# Patient Record
Sex: FEMALE | Race: WHITE | NOT HISPANIC OR LATINO | URBAN - METROPOLITAN AREA
[De-identification: names, ages, dates, MRNs, and addresses within clinical notes are randomized per-mention and may not be internally consistent; named-entity substitution may affect disease eponyms.]

---

## 2017-03-27 ENCOUNTER — APPOINTMENT (OUTPATIENT)
Age: 67
Setting detail: DERMATOLOGY
End: 2017-03-28

## 2017-03-27 DIAGNOSIS — Z85.828 PERSONAL HISTORY OF OTHER MALIGNANT NEOPLASM OF SKIN: ICD-10-CM

## 2017-03-27 DIAGNOSIS — Z71.89 OTHER SPECIFIED COUNSELING: ICD-10-CM

## 2017-03-27 DIAGNOSIS — L82.1 OTHER SEBORRHEIC KERATOSIS: ICD-10-CM

## 2017-03-27 DIAGNOSIS — D22 MELANOCYTIC NEVI: ICD-10-CM

## 2017-03-27 DIAGNOSIS — D18.0 HEMANGIOMA: ICD-10-CM

## 2017-03-27 DIAGNOSIS — L81.4 OTHER MELANIN HYPERPIGMENTATION: ICD-10-CM

## 2017-03-27 DIAGNOSIS — D485 NEOPLASM OF UNCERTAIN BEHAVIOR OF SKIN: ICD-10-CM

## 2017-03-27 PROBLEM — D18.01 HEMANGIOMA OF SKIN AND SUBCUTANEOUS TISSUE: Status: ACTIVE | Noted: 2017-03-27

## 2017-03-27 PROBLEM — D48.5 NEOPLASM OF UNCERTAIN BEHAVIOR OF SKIN: Status: ACTIVE | Noted: 2017-03-27

## 2017-03-27 PROBLEM — D22.5 MELANOCYTIC NEVI OF TRUNK: Status: ACTIVE | Noted: 2017-03-27

## 2017-03-27 PROBLEM — D23.72 OTHER BENIGN NEOPLASM OF SKIN OF LEFT LOWER LIMB, INCLUDING HIP: Status: ACTIVE | Noted: 2017-03-27

## 2017-03-27 PROCEDURE — OTHER COUNSELING: OTHER

## 2017-03-27 PROCEDURE — 99213 OFFICE O/P EST LOW 20 MIN: CPT | Mod: 25

## 2017-03-27 PROCEDURE — OTHER BIOPSY BY PUNCH METHOD: OTHER

## 2017-03-27 PROCEDURE — 11100: CPT

## 2017-03-27 ASSESSMENT — LOCATION SIMPLE DESCRIPTION DERM
LOCATION SIMPLE: RIGHT FOREARM
LOCATION SIMPLE: LEFT UPPER BACK
LOCATION SIMPLE: LEFT FOREARM
LOCATION SIMPLE: CHEST
LOCATION SIMPLE: RIGHT EYEBROW
LOCATION SIMPLE: UPPER BACK
LOCATION SIMPLE: LEFT ANTERIOR NECK

## 2017-03-27 ASSESSMENT — LOCATION DETAILED DESCRIPTION DERM
LOCATION DETAILED: RIGHT LATERAL EYEBROW
LOCATION DETAILED: RIGHT PROXIMAL DORSAL FOREARM
LOCATION DETAILED: LEFT PROXIMAL DORSAL FOREARM
LOCATION DETAILED: SUPERIOR THORACIC SPINE
LOCATION DETAILED: LEFT CLAVICULAR NECK
LOCATION DETAILED: LEFT MEDIAL UPPER BACK
LOCATION DETAILED: LEFT LATERAL SUPERIOR CHEST
LOCATION DETAILED: RIGHT MEDIAL SUPERIOR CHEST

## 2017-03-27 ASSESSMENT — LOCATION ZONE DERM
LOCATION ZONE: TRUNK
LOCATION ZONE: FACE
LOCATION ZONE: NECK
LOCATION ZONE: ARM

## 2017-03-27 NOTE — PROCEDURE: BIOPSY BY PUNCH METHOD
Consent: Written consent was obtained and risks were reviewed including but not limited to scarring, infection, bleeding, scabbing, incomplete removal, nerve damage and allergy to anesthesia.
Size Of Lesion In Cm (Optional): 0
Epidermal Sutures: 4-0 Silk
Dressing: bandage
Render Post-Care Instructions In Note?: no
Biopsy Type: H and E
Wound Care: Aquaphor
Anesthesia Volume In Cc (Will Not Render If 0): 3
Body Location Override (Optional - Billing Will Still Be Based On Selected Body Map Location If Applicable): Right forearm
Hemostasis: Pressure
Punch Size In Mm: 4
Anesthesia Type: 1% lidocaine with 1:100,000 epinephrine
Detail Level: Detailed
Billing Type: Third-Party Bill
Notification Instructions: Patient will be notified of biopsy results. However, patient instructed to call the office if not contacted within 2 weeks.
Suture Removal: 14 days

## 2017-04-10 ENCOUNTER — APPOINTMENT (OUTPATIENT)
Dept: URBAN - METROPOLITAN AREA CLINIC 282 | Age: 67
Setting detail: DERMATOLOGY
End: 2017-04-11

## 2017-04-10 DIAGNOSIS — Z48.02 ENCOUNTER FOR REMOVAL OF SUTURES: ICD-10-CM

## 2017-04-10 PROCEDURE — OTHER SUTURE REMOVAL (GLOBAL PERIOD): OTHER

## 2017-04-10 PROCEDURE — OTHER PATHOLOGY DISCUSSION: OTHER

## 2017-04-10 ASSESSMENT — LOCATION DETAILED DESCRIPTION DERM: LOCATION DETAILED: RIGHT DISTAL DORSAL FOREARM

## 2017-04-10 ASSESSMENT — LOCATION ZONE DERM: LOCATION ZONE: ARM

## 2017-04-10 ASSESSMENT — LOCATION SIMPLE DESCRIPTION DERM: LOCATION SIMPLE: RIGHT FOREARM

## 2017-04-10 NOTE — PROCEDURE: SUTURE REMOVAL (GLOBAL PERIOD)
Add 67176 Cpt? (Important Note: In 2017 The Use Of 43494 Is Being Tracked By Cms To Determine Future Global Period Reimbursement For Global Periods): no
Detail Level: Simple

## 2017-04-28 ENCOUNTER — APPOINTMENT (OUTPATIENT)
Dept: URBAN - METROPOLITAN AREA CLINIC 282 | Age: 67
Setting detail: DERMATOLOGY
End: 2017-04-29

## 2017-04-28 PROBLEM — C44.612 BASAL CELL CARCINOMA OF SKIN OF RIGHT UPPER LIMB, INCLUDING SHOULDER: Status: ACTIVE | Noted: 2017-04-28

## 2017-04-28 PROCEDURE — OTHER CURETTAGE AND DESTRUCTION: OTHER

## 2017-04-28 PROCEDURE — 17260 DSTRJ MAL LES T/A/L 0.5 CM/<: CPT

## 2017-04-28 NOTE — PROCEDURE: CURETTAGE AND DESTRUCTION
Body Location Override (Optional - Billing Will Still Be Based On Selected Body Map Location If Applicable): Right forearm
Detail Level: Detailed
Consent was obtained from the patient. The risks, benefits and alternatives to therapy were discussed in detail. Specifically, the risks of infection, scarring, bleeding, prolonged wound healing, nerve injury, incomplete removal, allergy to anesthesia and recurrence were addressed. Alternatives to ED&C, such as: surgical removal and XRT were also discussed.  Prior to the procedure, the treatment site was clearly identified and confirmed by the patient. All components of Universal Protocol/PAUSE Rule completed.
Anesthesia Type: 1% lidocaine with 1:100,000 epinephrine
Render Post-Care Instructions In Note?: no
Bill As A Line Item Or As Units: Line Item
Size Of Lesion In Cm: 0.2
Cautery Type: electrodesiccation
What Was Performed First?: Curettage
Post-Care Instructions: I reviewed with the patient in detail post-care instructions. Patient is to keep the area dry for 48 hours, and not to engage in any swimming until the area is healed. Should the patient develop any fevers, chills, bleeding, severe pain patient will contact the office immediately.
Additional Information: (Optional): The wound was cleaned, and a pressure dressing was applied.  The patient received detailed post-op instructions.
Size Of Lesion After Curettage: 0.4
Number Of Curettages: 3
Anesthesia Volume In Cc: 8

## 2017-05-01 ENCOUNTER — RX ONLY (RX ONLY)
Age: 67
End: 2017-05-01

## 2017-05-01 RX ORDER — MUPIROCIN 20 MG/G
OINTMENT TOPICAL
Qty: 1 | Refills: 1 | Status: ERX | COMMUNITY
Start: 2017-05-01

## 2017-05-11 ENCOUNTER — APPOINTMENT (OUTPATIENT)
Dept: URBAN - METROPOLITAN AREA CLINIC 282 | Age: 67
Setting detail: DERMATOLOGY
End: 2017-05-12

## 2017-05-11 DIAGNOSIS — Z48.817 ENCOUNTER FOR SURGICAL AFTERCARE FOLLOWING SURGERY ON THE SKIN AND SUBCUTANEOUS TISSUE: ICD-10-CM

## 2017-05-11 PROCEDURE — OTHER OTHER: OTHER

## 2017-05-11 PROCEDURE — OTHER COUNSELING: OTHER

## 2017-05-11 PROCEDURE — 99212 OFFICE O/P EST SF 10 MIN: CPT

## 2017-05-11 ASSESSMENT — LOCATION DETAILED DESCRIPTION DERM: LOCATION DETAILED: RIGHT DISTAL DORSAL FOREARM

## 2017-05-11 ASSESSMENT — LOCATION ZONE DERM: LOCATION ZONE: ARM

## 2017-05-11 ASSESSMENT — LOCATION SIMPLE DESCRIPTION DERM: LOCATION SIMPLE: RIGHT FOREARM

## 2017-05-11 NOTE — PROCEDURE: OTHER
Other (Free Text): Wound is dry, no pus, not red, no warmth. Apply the Mupriocin and cover with a dressing twice daily, or apply ointment 3 or 4 times daily to keep wound moist
Note Text (......Xxx Chief Complaint.): This diagnosis correlates with the
Detail Level: Detailed

## 2018-07-02 ENCOUNTER — APPOINTMENT (OUTPATIENT)
Dept: URBAN - METROPOLITAN AREA CLINIC 282 | Age: 68
Setting detail: DERMATOLOGY
End: 2018-07-02

## 2018-07-02 DIAGNOSIS — L57.0 ACTINIC KERATOSIS: ICD-10-CM

## 2018-07-02 DIAGNOSIS — D18.0 HEMANGIOMA: ICD-10-CM

## 2018-07-02 DIAGNOSIS — Z85.828 PERSONAL HISTORY OF OTHER MALIGNANT NEOPLASM OF SKIN: ICD-10-CM

## 2018-07-02 DIAGNOSIS — Z12.83 ENCOUNTER FOR SCREENING FOR MALIGNANT NEOPLASM OF SKIN: ICD-10-CM

## 2018-07-02 DIAGNOSIS — L81.5 LEUKODERMA, NOT ELSEWHERE CLASSIFIED: ICD-10-CM

## 2018-07-02 DIAGNOSIS — L82.1 OTHER SEBORRHEIC KERATOSIS: ICD-10-CM

## 2018-07-02 DIAGNOSIS — Z71.89 OTHER SPECIFIED COUNSELING: ICD-10-CM

## 2018-07-02 DIAGNOSIS — D22 MELANOCYTIC NEVI: ICD-10-CM

## 2018-07-02 PROBLEM — D22.71 MELANOCYTIC NEVI OF RIGHT LOWER LIMB, INCLUDING HIP: Status: ACTIVE | Noted: 2018-07-02

## 2018-07-02 PROBLEM — D18.01 HEMANGIOMA OF SKIN AND SUBCUTANEOUS TISSUE: Status: ACTIVE | Noted: 2018-07-02

## 2018-07-02 PROBLEM — D22.5 MELANOCYTIC NEVI OF TRUNK: Status: ACTIVE | Noted: 2018-07-02

## 2018-07-02 PROCEDURE — 99214 OFFICE O/P EST MOD 30 MIN: CPT | Mod: 25

## 2018-07-02 PROCEDURE — 17003 DESTRUCT PREMALG LES 2-14: CPT

## 2018-07-02 PROCEDURE — 17000 DESTRUCT PREMALG LESION: CPT

## 2018-07-02 PROCEDURE — OTHER OBSERVATION AND MEASURE: OTHER

## 2018-07-02 PROCEDURE — OTHER OBSERVATION: OTHER

## 2018-07-02 PROCEDURE — OTHER MIPS QUALITY: OTHER

## 2018-07-02 PROCEDURE — OTHER LIQUID NITROGEN: OTHER

## 2018-07-02 PROCEDURE — OTHER COUNSELING: OTHER

## 2018-07-02 ASSESSMENT — LOCATION DETAILED DESCRIPTION DERM
LOCATION DETAILED: RIGHT INFERIOR LATERAL FOREHEAD
LOCATION DETAILED: LEFT INFERIOR UPPER BACK
LOCATION DETAILED: EPIGASTRIC SKIN
LOCATION DETAILED: RIGHT ANKLE
LOCATION DETAILED: LEFT PROXIMAL DORSAL FOREARM
LOCATION DETAILED: LEFT MEDIAL UPPER BACK
LOCATION DETAILED: LEFT INFERIOR CENTRAL MALAR CHEEK
LOCATION DETAILED: RIGHT PROXIMAL DORSAL FOREARM
LOCATION DETAILED: INFERIOR THORACIC SPINE
LOCATION DETAILED: SUPERIOR THORACIC SPINE

## 2018-07-02 ASSESSMENT — LOCATION ZONE DERM
LOCATION ZONE: ARM
LOCATION ZONE: LEG
LOCATION ZONE: FACE
LOCATION ZONE: TRUNK

## 2018-07-02 ASSESSMENT — LOCATION SIMPLE DESCRIPTION DERM
LOCATION SIMPLE: RIGHT FOREHEAD
LOCATION SIMPLE: LEFT UPPER BACK
LOCATION SIMPLE: RIGHT FOREARM
LOCATION SIMPLE: UPPER BACK
LOCATION SIMPLE: LEFT FOREARM
LOCATION SIMPLE: RIGHT ANKLE
LOCATION SIMPLE: ABDOMEN
LOCATION SIMPLE: LEFT CHEEK

## 2018-07-02 NOTE — PROCEDURE: LIQUID NITROGEN
Duration Of Freeze Thaw-Cycle (Seconds): 8
Consent: The patient's consent was obtained including but not limited to risks of crusting, scabbing, blistering, scarring, darker or lighter pigmentary change, recurrence, incomplete removal and infection.
Render Post-Care Instructions In Note?: no
Number Of Freeze-Thaw Cycles: 1 freeze-thaw cycle
Detail Level: Detailed
Post-Care Instructions: I reviewed with the patient in detail post-care instructions. Patient is to wear sunprotection, and avoid picking at any of the treated lesions. Pt may apply Vaseline to crusted or scabbing areas.

## 2018-12-10 ENCOUNTER — APPOINTMENT (OUTPATIENT)
Dept: URBAN - METROPOLITAN AREA CLINIC 282 | Age: 68
Setting detail: DERMATOLOGY
End: 2018-12-11

## 2018-12-10 DIAGNOSIS — L72.0 EPIDERMAL CYST: ICD-10-CM

## 2018-12-10 PROCEDURE — 99212 OFFICE O/P EST SF 10 MIN: CPT

## 2018-12-10 PROCEDURE — OTHER COUNSELING: OTHER

## 2018-12-10 PROCEDURE — OTHER REASSURANCE: OTHER

## 2018-12-10 ASSESSMENT — LOCATION ZONE DERM: LOCATION ZONE: FACE

## 2018-12-10 ASSESSMENT — LOCATION DETAILED DESCRIPTION DERM: LOCATION DETAILED: LEFT SUPERIOR LATERAL MALAR CHEEK

## 2018-12-10 ASSESSMENT — LOCATION SIMPLE DESCRIPTION DERM: LOCATION SIMPLE: LEFT CHEEK

## 2023-06-28 NOTE — HPI: FULL BODY SKIN EXAMINATION
How Severe Are Your Spot(S)?: mild
What Is The Reason For Today's Visit?: Full Body Skin Examination
What Is The Reason For Today's Visit? (Being Monitored For X): concerning skin lesions on an annual basis
Additional History: Patient has no concerns
General